# Patient Record
Sex: MALE | Race: WHITE | NOT HISPANIC OR LATINO | Employment: OTHER | URBAN - METROPOLITAN AREA
[De-identification: names, ages, dates, MRNs, and addresses within clinical notes are randomized per-mention and may not be internally consistent; named-entity substitution may affect disease eponyms.]

---

## 2019-02-20 ENCOUNTER — TELEPHONE (OUTPATIENT)
Dept: UROLOGY | Facility: MEDICAL CENTER | Age: 84
End: 2019-02-20

## 2019-02-20 NOTE — TELEPHONE ENCOUNTER
Reason for appointment/Complaint/Diagnosis : Urinary retention / pt has catheter  Patient was in [facility]  Daughter to have records faxed to [phone]  Insurance: Medicare    History of Cancer? No                      If yes, what kind? Not applicable    Previous urologist?     [facility] Chant[facility] Escud[facility]  [facility]sp[facility]     Records requested/where? [facility]     Outside testing/where? Not applicable    Location Preference for office visit?  [place]

## 2019-02-20 NOTE — TELEPHONE ENCOUNTER
Patient's daughter understands records are needed so this can be triaged  She's working on getting them today

## 2019-02-22 NOTE — TELEPHONE ENCOUNTER
Spoke with pt's daughter, Hussein Polanco, scheduled appt with [facility] in [address] [place] [address] 2/28  Time ok per Lukas Gentile  Please mail NP packet

## 2019-02-22 NOTE — TELEPHONE ENCOUNTER
Records received and refaxed to [facility] @ [phone]  Will take copy over to 240 to triage

## 2019-02-28 ENCOUNTER — OFFICE VISIT (OUTPATIENT)
Dept: UROLOGY | Facility: CLINIC | Age: 84
End: 2019-02-28
Payer: MEDICARE

## 2019-02-28 VITALS
HEIGHT: 65 IN | BODY MASS INDEX: 21.83 KG/M2 | WEIGHT: 131 LBS | SYSTOLIC BLOOD PRESSURE: 140 MMHG | DIASTOLIC BLOOD PRESSURE: 64 MMHG

## 2019-02-28 DIAGNOSIS — N40.1 BPH WITH OBSTRUCTION/LOWER URINARY TRACT SYMPTOMS: ICD-10-CM

## 2019-02-28 DIAGNOSIS — R33.9 URINARY RETENTION: Primary | ICD-10-CM

## 2019-02-28 DIAGNOSIS — N13.8 BPH WITH OBSTRUCTION/LOWER URINARY TRACT SYMPTOMS: ICD-10-CM

## 2019-02-28 PROCEDURE — 99204 OFFICE O/P NEW MOD 45 MIN: CPT | Performed by: UROLOGY

## 2019-02-28 RX ORDER — HYDROCODONE BITARTRATE AND ACETAMINOPHEN 5; 325 MG/1; MG/1
TABLET ORAL
COMMUNITY
Start: 2013-11-07

## 2019-02-28 RX ORDER — LEVOTHYROXINE SODIUM 0.03 MG/1
25 TABLET ORAL EVERY OTHER DAY
COMMUNITY

## 2019-02-28 RX ORDER — ASPIRIN 81 MG/1
81 TABLET, CHEWABLE ORAL
COMMUNITY
Start: 2012-02-15

## 2019-02-28 RX ORDER — CILOSTAZOL 50 MG/1
50 TABLET ORAL
COMMUNITY

## 2019-02-28 RX ORDER — LEVOTHYROXINE SODIUM 0.05 MG/1
50 TABLET ORAL
COMMUNITY

## 2019-02-28 RX ORDER — RANITIDINE 150 MG/1
150 CAPSULE ORAL
COMMUNITY

## 2019-02-28 RX ORDER — ATORVASTATIN CALCIUM 40 MG/1
40 TABLET, FILM COATED ORAL
COMMUNITY

## 2019-02-28 RX ORDER — FINASTERIDE 5 MG/1
5 TABLET, FILM COATED ORAL
COMMUNITY

## 2019-02-28 RX ORDER — LOSARTAN POTASSIUM 50 MG/1
50 TABLET ORAL
COMMUNITY

## 2019-02-28 RX ORDER — PANTOPRAZOLE SODIUM 40 MG/1
40 TABLET, DELAYED RELEASE ORAL
COMMUNITY

## 2019-02-28 RX ORDER — TAMSULOSIN HYDROCHLORIDE 0.4 MG/1
0.4 CAPSULE ORAL
COMMUNITY
Start: 2012-02-15

## 2019-02-28 NOTE — PROGRESS NOTES
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  Stay on the Flomax and the Proscar and I think his prognosis for recovery is good  Will see him back in the next few weeks for a voiding trial early in the morning  Chief Complaint     Chief Complaint   Patient presents with    Benign Prostatic Hypertrophy    Urinary Retention       History of Present Illness   New patient office visit:  BPH with obstruction with urinary retention  He currently has a Purcell catheter  He was going to have TURP in  but 1 of having heart bypass surgery and was seeing a urologist in being managed with medications up until now  He was actually doing quite well up until now when he had pneumonia a and he went into urinary retention  The pneumonia was 2 weeks ago  He could not urinate, but he did have sensation and volumes were about 600 cc  He is on Flomax and Proscar  Past medical history:  Coronary artery disease, pneumonia, hypothyroidism, bleeding ulcers, spinal stenosis,    Past surgical history:  Coronary artery bypass grafting, back surgery, appendectomy, hernia repair, partial gastrectomy due to bleeding ulcers    Social history:  Lives with his grandson  Quit smoking 30 years ago  Retired   The following portions of the patient's history were reviewed and updated as appropriate: allergies, current medications, past family history, past medical history, past social history, past surgical history and problem list     Review of Systems   Review of Systems   Gastrointestinal: Negative      Genitourinary: Urinary retention with Purcell catheter in place       Active Problem List   There is no problem list on file for this patient  Objective   /64   Ht 5' 5" (1 651 m)   Wt 59 4 kg (131 lb)   BMI 21 80 kg/m²     Physical Exam   Constitutional: He is oriented to person, place, and time  He appears well-developed and well-nourished  HENT:   Head: Normocephalic and atraumatic  Eyes: EOM are normal    Neck: Normal range of motion  Pulmonary/Chest: Effort normal    Abdominal: Soft  He exhibits no distension and no mass  There is no tenderness  There is no rebound and no guarding  No hernia  Genitourinary: Rectum normal, prostate normal and penis normal    Genitourinary Comments: Purcell catheter in place  Normal uncircumcised phallus  No inguinal hernia  Testicles are descended bilaterally and are within normal limits  Skin is normal   Rectal exam reveals normal tone, no masses and his prostate is about 40-50 g, smooth symmetric and benign  No nodules  Musculoskeletal: Normal range of motion  Neurological: He is alert and oriented to person, place, and time  Skin: Skin is warm and dry  Psychiatric: He has a normal mood and affect   His behavior is normal  Judgment and thought content normal            Current Medications     Current Outpatient Medications:     aspirin 81 mg CHEW, Chew 81 mg, Disp: , Rfl:     atorvastatin (LIPITOR) 40 mg tablet, Take 40 mg by mouth, Disp: , Rfl:     cilostazol (PLETAL) 50 mg tablet, Take 50 mg by mouth, Disp: , Rfl:     finasteride (PROSCAR) 5 mg tablet, Take 5 mg by mouth, Disp: , Rfl:     HYDROcodone-acetaminophen (NORCO) 5-325 mg per tablet, q6hprn, Disp: , Rfl:     levothyroxine 25 mcg tablet, Take 25 mcg by mouth every other day , Disp: , Rfl:     levothyroxine 50 mcg tablet, Take 50 mcg by mouth, Disp: , Rfl:     losartan (COZAAR) 50 mg tablet, Take 50 mg by mouth, Disp: , Rfl:     pantoprazole (PROTONIX) 40 mg tablet, Take 40 mg by mouth, Disp: , Rfl:     ranitidine (ZANTAC) 150 MG capsule, Take 150 mg by mouth, Disp: , Rfl:     tamsulosin (FLOMAX) 0 4 mg, Take 0 4 mg by mouth, Disp: , Rfl:         Samantha Miller MD

## 2019-03-14 ENCOUNTER — OFFICE VISIT (OUTPATIENT)
Dept: UROLOGY | Facility: CLINIC | Age: 84
End: 2019-03-14

## 2019-03-14 VITALS
HEART RATE: 69 BPM | HEIGHT: 65 IN | BODY MASS INDEX: 22.49 KG/M2 | DIASTOLIC BLOOD PRESSURE: 60 MMHG | WEIGHT: 135 LBS | SYSTOLIC BLOOD PRESSURE: 130 MMHG

## 2019-03-14 DIAGNOSIS — R33.9 URINARY RETENTION: Primary | ICD-10-CM

## 2019-03-14 PROCEDURE — 99212 OFFICE O/P EST SF 10 MIN: CPT | Performed by: UROLOGY

## 2019-03-14 RX ORDER — CIPROFLOXACIN 500 MG/1
500 TABLET, FILM COATED ORAL ONCE
Qty: 1 TABLET | Refills: 0 | Status: SHIPPED | OUTPATIENT
Start: 2019-03-14 | End: 2019-03-14

## 2019-03-14 NOTE — PROGRESS NOTES
[address] [facility]  [facility]  [address] [facility]  [place], [address] [place] [address]  [phone]  www  [facility]  org      NAME: Angelika Win  AGE: 80 y o  SEX: male  : 1932   MRN: [id number]    DATE: 3/14/2019  TIME: 8:32 AM    Assessment and Plan:    Urinary retention after pneumonia, hopefully this has resolved  Purcell catheter was removed and he was given 1 pill of Cipro  We will see if he can urinate and if he cannot we will replace the catheter  If he can urinate, I will see him back p r n  Elkin Edouard Chief Complaint   No chief complaint on file  History of Present Illness   Urinary retention acute after debilitating illness which was pneumonia  This was on top of 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  He is here for a voiding trial   No current complaints  The catheter was removed intact  The following portions of the patient's history were reviewed and updated as appropriate: allergies, current medications, past family history, past medical history, past social history, past surgical history and problem list     Review of Systems   Review of Systems    Active Problem List   There is no problem list on file for this patient  Objective   There were no vitals taken for this visit      Physical Exam        Current Medications     Current Outpatient Medications:     aspirin 81 mg CHEW, Chew 81 mg, Disp: , Rfl:     atorvastatin (LIPITOR) 40 mg tablet, Take 40 mg by mouth, Disp: , Rfl:     cilostazol (PLETAL) 50 mg tablet, Take 50 mg by mouth, Disp: , Rfl:     finasteride (PROSCAR) 5 mg tablet, Take 5 mg by mouth, Disp: , Rfl:     HYDROcodone-acetaminophen (NORCO) 5-325 mg per tablet, q6hprn, Disp: , Rfl:     levothyroxine 25 mcg tablet, Take 25 mcg by mouth every other day , Disp: , Rfl:     levothyroxine 50 mcg tablet, Take 50 mcg by mouth, Disp: , Rfl:     losartan (COZAAR) 50 mg tablet, Take 50 mg by mouth, Disp: , Rfl:    pantoprazole (PROTONIX) 40 mg tablet, Take 40 mg by mouth, Disp: , Rfl:     ranitidine (ZANTAC) 150 MG capsule, Take 150 mg by mouth, Disp: , Rfl:     tamsulosin (FLOMAX) 0 4 mg, Take 0 4 mg by mouth, Disp: , Rfl:         Juarez Ac MD

## 2019-03-14 NOTE — PROGRESS NOTES
Patient returns for Purcell removal  Purcell removed without difficulty, patient tolerated procedure well  Urine draining clear yellow  Denies fever or urinary symptoms  Patient instructed to increase fluids, especially water, and limit caffeine intake  To return to office if unable to void within 4-6 hours, or has increased discomfort

## 2021-10-13 ENCOUNTER — APPOINTMENT (RX ONLY)
Dept: URBAN - NONMETROPOLITAN AREA CLINIC 4 | Facility: CLINIC | Age: 86
Setting detail: DERMATOLOGY
End: 2021-10-13

## 2021-10-13 PROBLEM — D48.5 NEOPLASM OF UNCERTAIN BEHAVIOR OF SKIN: Status: ACTIVE | Noted: 2021-10-13

## 2021-10-13 PROCEDURE — 11312 SHAVE SKIN LESION 1.1-2.0 CM: CPT

## 2021-10-13 PROCEDURE — ? SHAVE REMOVAL

## 2021-10-13 NOTE — PROCEDURE: SHAVE REMOVAL
Medical Necessity Information: It is in your best interest to select a reason for this procedure from the list below. All of these items fulfill various CMS LCD requirements except the new and changing color options.
Medical Necessity Clause: This procedure was medically necessary because the lesion that was treated was:
Lab: 6
Lab Facility: 3
Detail Level: Detailed
Was A Bandage Applied: Yes
Size Of Lesion In Cm (Required): 1.2
X Size Of Lesion In Cm (Optional): 0
Biopsy Method: Dermablade
Anesthesia Type: 1% lidocaine without epinephrine
Hemostasis: Aluminum Chloride and Electrocautery
Wound Care: Bacitracin
Render Path Notes In Note?: No
Consent was obtained from the patient. The risks and benefits to therapy were discussed in detail. Specifically, the risks of infection, scarring, bleeding, prolonged wound healing, incomplete removal, allergy to anesthesia, nerve injury and recurrence were addressed. Prior to the procedure, the treatment site was clearly identified and confirmed by the patient. All components of Universal Protocol/PAUSE Rule completed.
Post-Care Instructions: I reviewed with the patient in detail post-care instructions. Patient is to keep the biopsy site dry overnight, and then apply bacitracin twice daily until healed. Patient may apply hydrogen peroxide soaks to remove any crusting.
Notification Instructions: Patient will be notified of pathology results. However, patient instructed to call the office if not contacted within 2 weeks.
Billing Type: Third-Party Bill

## 2021-12-08 ENCOUNTER — APPOINTMENT (RX ONLY)
Dept: URBAN - NONMETROPOLITAN AREA CLINIC 4 | Facility: CLINIC | Age: 86
Setting detail: DERMATOLOGY
End: 2021-12-08

## 2021-12-08 DIAGNOSIS — L28.0 LICHEN SIMPLEX CHRONICUS: ICD-10-CM | Status: INADEQUATELY CONTROLLED

## 2021-12-08 PROCEDURE — 99214 OFFICE O/P EST MOD 30 MIN: CPT

## 2021-12-08 PROCEDURE — ? TREATMENT REGIMEN

## 2021-12-08 PROCEDURE — ? PRESCRIPTION

## 2021-12-08 PROCEDURE — ? COUNSELING

## 2021-12-08 RX ORDER — HYDROCORTISONE 25 MG/G
2.5% OINTMENT TOPICAL BID
Qty: 28.35 | Refills: 2 | Status: ERX | COMMUNITY
Start: 2021-12-08

## 2021-12-08 RX ADMIN — HYDROCORTISONE 2.5%: 25 OINTMENT TOPICAL at 00:00

## 2021-12-08 ASSESSMENT — LOCATION ZONE DERM: LOCATION ZONE: FACE

## 2021-12-08 ASSESSMENT — LOCATION SIMPLE DESCRIPTION DERM: LOCATION SIMPLE: LEFT CHEEK

## 2021-12-08 ASSESSMENT — LOCATION DETAILED DESCRIPTION DERM: LOCATION DETAILED: LEFT SUPERIOR CENTRAL MALAR CHEEK

## 2021-12-08 NOTE — PROCEDURE: TREATMENT REGIMEN
Detail Level: Zone
Plan: Patient will follow up with eye doctor due to complaints of watering gritty eyes.
Modify Regimen: Do not rub or scratch at area.
Initiate Treatment: Hydrocortisone ointment BID x 1 month; daily moisturizing cream Cerave or Cetaphil

## 2024-10-26 ENCOUNTER — APPOINTMENT (EMERGENCY)
Dept: RADIOLOGY | Facility: HOSPITAL | Age: 89
End: 2024-10-26
Payer: COMMERCIAL

## 2024-10-26 ENCOUNTER — HOSPITAL ENCOUNTER (EMERGENCY)
Facility: HOSPITAL | Age: 89
Discharge: HOME | End: 2024-10-26
Attending: EMERGENCY MEDICINE
Payer: COMMERCIAL

## 2024-10-26 VITALS
RESPIRATION RATE: 16 BRPM | HEART RATE: 56 BPM | OXYGEN SATURATION: 98 % | WEIGHT: 127.87 LBS | HEIGHT: 66 IN | SYSTOLIC BLOOD PRESSURE: 148 MMHG | DIASTOLIC BLOOD PRESSURE: 64 MMHG | TEMPERATURE: 97.3 F | BODY MASS INDEX: 20.55 KG/M2

## 2024-10-26 DIAGNOSIS — R07.89 CHEST TIGHTNESS: ICD-10-CM

## 2024-10-26 DIAGNOSIS — J06.9 VIRAL UPPER RESPIRATORY TRACT INFECTION: ICD-10-CM

## 2024-10-26 DIAGNOSIS — J44.1 COPD EXACERBATION (HCC): Primary | ICD-10-CM

## 2024-10-26 LAB
ALBUMIN SERPL BCG-MCNC: 3.9 G/DL (ref 3.5–5)
ALP SERPL-CCNC: 67 U/L (ref 34–104)
ALT SERPL W P-5'-P-CCNC: 11 U/L (ref 7–52)
ANION GAP SERPL CALCULATED.3IONS-SCNC: 8 MMOL/L (ref 4–13)
AST SERPL W P-5'-P-CCNC: 14 U/L (ref 13–39)
BASOPHILS # BLD MANUAL: 0.04 THOUSAND/UL (ref 0–0.1)
BASOPHILS NFR MAR MANUAL: 1 % (ref 0–1)
BILIRUB DIRECT SERPL-MCNC: 0.27 MG/DL (ref 0–0.2)
BILIRUB SERPL-MCNC: 0.51 MG/DL (ref 0.2–1)
BNP SERPL-MCNC: 60 PG/ML (ref 0–100)
BUN SERPL-MCNC: 31 MG/DL (ref 5–25)
BURR CELLS BLD QL SMEAR: PRESENT
CALCIUM SERPL-MCNC: 8.8 MG/DL (ref 8.4–10.2)
CARDIAC TROPONIN I PNL SERPL HS: 4 NG/L (ref 8–18)
CHLORIDE SERPL-SCNC: 104 MMOL/L (ref 96–108)
CO2 SERPL-SCNC: 28 MMOL/L (ref 21–32)
CREAT SERPL-MCNC: 1.6 MG/DL (ref 0.6–1.3)
EOSINOPHIL # BLD MANUAL: 0.27 THOUSAND/UL (ref 0–0.4)
EOSINOPHIL NFR BLD MANUAL: 7 % (ref 0–6)
ERYTHROCYTE [DISTWIDTH] IN BLOOD BY AUTOMATED COUNT: 14.5 % (ref 11.6–15.1)
FLUAV AG UPPER RESP QL IA.RAPID: NEGATIVE
FLUBV AG UPPER RESP QL IA.RAPID: NEGATIVE
GFR SERPL CREATININE-BSD FRML MDRD: 36 ML/MIN/1.73SQ M
GLUCOSE SERPL-MCNC: 101 MG/DL (ref 65–140)
HCT VFR BLD AUTO: 39 % (ref 36.5–49.3)
HGB BLD-MCNC: 11.8 G/DL (ref 12–17)
LYMPHOCYTES # BLD AUTO: 1.18 THOUSAND/UL (ref 0.6–4.47)
LYMPHOCYTES # BLD AUTO: 22 % (ref 14–44)
MAGNESIUM SERPL-MCNC: 2.1 MG/DL (ref 1.9–2.7)
MCH RBC QN AUTO: 27.8 PG (ref 26.8–34.3)
MCHC RBC AUTO-ENTMCNC: 30.3 G/DL (ref 31.4–37.4)
MCV RBC AUTO: 92 FL (ref 82–98)
MONOCYTES # BLD AUTO: 0.38 THOUSAND/UL (ref 0–1.22)
MONOCYTES NFR BLD: 10 % (ref 4–12)
NEUTROPHILS # BLD MANUAL: 1.94 THOUSAND/UL (ref 1.85–7.62)
NEUTS SEG NFR BLD AUTO: 51 % (ref 43–75)
PHOSPHATE SERPL-MCNC: 4 MG/DL (ref 2.3–4.1)
PLATELET # BLD AUTO: 97 THOUSANDS/UL (ref 149–390)
PLATELET BLD QL SMEAR: ABNORMAL
PMV BLD AUTO: 9.3 FL (ref 8.9–12.7)
POIKILOCYTOSIS BLD QL SMEAR: PRESENT
POTASSIUM SERPL-SCNC: 4.5 MMOL/L (ref 3.5–5.3)
PROCALCITONIN SERPL-MCNC: <0.05 NG/ML
PROT SERPL-MCNC: 5.8 G/DL (ref 6.4–8.4)
RBC # BLD AUTO: 4.25 MILLION/UL (ref 3.88–5.62)
SARS-COV+SARS-COV-2 AG RESP QL IA.RAPID: NEGATIVE
SMUDGE CELLS BLD QL SMEAR: PRESENT
SODIUM SERPL-SCNC: 140 MMOL/L (ref 135–147)
VARIANT LYMPHS # BLD AUTO: 9 %
WBC # BLD AUTO: 3.81 THOUSAND/UL (ref 4.31–10.16)

## 2024-10-26 PROCEDURE — 93005 ELECTROCARDIOGRAM TRACING: CPT

## 2024-10-26 PROCEDURE — 99285 EMERGENCY DEPT VISIT HI MDM: CPT | Performed by: EMERGENCY MEDICINE

## 2024-10-26 PROCEDURE — 85027 COMPLETE CBC AUTOMATED: CPT

## 2024-10-26 PROCEDURE — 71046 X-RAY EXAM CHEST 2 VIEWS: CPT

## 2024-10-26 PROCEDURE — 84100 ASSAY OF PHOSPHORUS: CPT

## 2024-10-26 PROCEDURE — 87811 SARS-COV-2 COVID19 W/OPTIC: CPT

## 2024-10-26 PROCEDURE — 80076 HEPATIC FUNCTION PANEL: CPT

## 2024-10-26 PROCEDURE — 87804 INFLUENZA ASSAY W/OPTIC: CPT

## 2024-10-26 PROCEDURE — 80048 BASIC METABOLIC PNL TOTAL CA: CPT

## 2024-10-26 PROCEDURE — 84145 PROCALCITONIN (PCT): CPT | Performed by: EMERGENCY MEDICINE

## 2024-10-26 PROCEDURE — 85007 BL SMEAR W/DIFF WBC COUNT: CPT

## 2024-10-26 PROCEDURE — 83735 ASSAY OF MAGNESIUM: CPT

## 2024-10-26 PROCEDURE — 96374 THER/PROPH/DIAG INJ IV PUSH: CPT

## 2024-10-26 PROCEDURE — 83880 ASSAY OF NATRIURETIC PEPTIDE: CPT | Performed by: EMERGENCY MEDICINE

## 2024-10-26 PROCEDURE — 84484 ASSAY OF TROPONIN QUANT: CPT | Performed by: EMERGENCY MEDICINE

## 2024-10-26 PROCEDURE — 99285 EMERGENCY DEPT VISIT HI MDM: CPT

## 2024-10-26 PROCEDURE — 94640 AIRWAY INHALATION TREATMENT: CPT

## 2024-10-26 PROCEDURE — 36415 COLL VENOUS BLD VENIPUNCTURE: CPT

## 2024-10-26 PROCEDURE — 96372 THER/PROPH/DIAG INJ SC/IM: CPT

## 2024-10-26 RX ORDER — ONDANSETRON 2 MG/ML
4 INJECTION INTRAMUSCULAR; INTRAVENOUS ONCE
Status: COMPLETED | OUTPATIENT
Start: 2024-10-26 | End: 2024-10-26

## 2024-10-26 RX ORDER — PREDNISONE 20 MG/1
20 TABLET ORAL DAILY
Qty: 3 TABLET | Refills: 0 | Status: SHIPPED | OUTPATIENT
Start: 2024-10-26 | End: 2024-10-29

## 2024-10-26 RX ORDER — AZITHROMYCIN 500 MG/1
500 TABLET, FILM COATED ORAL DAILY
Qty: 5 TABLET | Refills: 0 | Status: SHIPPED | OUTPATIENT
Start: 2024-10-26 | End: 2024-10-31

## 2024-10-26 RX ORDER — PREDNISONE 20 MG/1
40 TABLET ORAL ONCE
Status: COMPLETED | OUTPATIENT
Start: 2024-10-26 | End: 2024-10-26

## 2024-10-26 RX ORDER — GABAPENTIN 100 MG/1
100 CAPSULE ORAL DAILY
COMMUNITY

## 2024-10-26 RX ORDER — FAMOTIDINE 40 MG/1
40 TABLET, FILM COATED ORAL DAILY
COMMUNITY

## 2024-10-26 RX ORDER — IPRATROPIUM BROMIDE AND ALBUTEROL SULFATE 2.5; .5 MG/3ML; MG/3ML
3 SOLUTION RESPIRATORY (INHALATION) 4 TIMES DAILY
Qty: 30 ML | Refills: 0 | Status: SHIPPED | OUTPATIENT
Start: 2024-10-26 | End: 2024-11-25

## 2024-10-26 RX ORDER — AZITHROMYCIN 250 MG/1
500 TABLET, FILM COATED ORAL ONCE
Status: COMPLETED | OUTPATIENT
Start: 2024-10-26 | End: 2024-10-26

## 2024-10-26 RX ORDER — FUROSEMIDE 20 MG/1
20 TABLET ORAL EVERY 24 HOURS
COMMUNITY

## 2024-10-26 RX ORDER — IPRATROPIUM BROMIDE AND ALBUTEROL SULFATE 2.5; .5 MG/3ML; MG/3ML
3 SOLUTION RESPIRATORY (INHALATION) ONCE
Status: COMPLETED | OUTPATIENT
Start: 2024-10-26 | End: 2024-10-26

## 2024-10-26 RX ADMIN — IPRATROPIUM BROMIDE AND ALBUTEROL SULFATE 3 ML: .5; 3 SOLUTION RESPIRATORY (INHALATION) at 16:35

## 2024-10-26 RX ADMIN — ONDANSETRON 4 MG: 2 INJECTION INTRAMUSCULAR; INTRAVENOUS at 16:35

## 2024-10-26 RX ADMIN — PREDNISONE 40 MG: 20 TABLET ORAL at 17:10

## 2024-10-26 RX ADMIN — AZITHROMYCIN DIHYDRATE 500 MG: 250 TABLET ORAL at 17:09

## 2024-10-26 RX ADMIN — TRIMETHOBENZAMIDE HYDROCHLORIDE 200 MG: 100 INJECTION INTRAMUSCULAR at 17:30

## 2024-10-26 NOTE — DISCHARGE INSTRUCTIONS
Please take azithromycin 500mg daily for 5 days, prednisone 20mg for 3 days, and use duo-neb every day.     Schedule follow up visit with primary care doctor with the earliest appointment after ED visit.    If patient has increased SOB, nausea, vomiting, please return to the ED for further evaluation.

## 2024-10-26 NOTE — ED ATTENDING ATTESTATION
10/26/2024  I, Philip Ferreira DO, saw and evaluated the patient. I have discussed the patient with Dr Pond and agree with her] findings, Plan of Care, and MDM as documented in her note, except where noted. All available labs and Radiology studies were reviewed.  I was present for key portions of any procedure(s) performed by Dr Pond, and I was immediately available to provide assistance.  At this point I agree with the current assessment done in the Emergency Department.  I have conducted an independent evaluation of this patient. The history and physical is as follows:    89 y/o man with noted PMH presents to ED with family; has had persistent cough p/o phlegm for about the past week. Had mild URI sx beginning about 1 wk prior, after which his daughter became ill with similar sx. Sx began after travel to [place] 1 wk prior. Patient was febrile x1d but not persistently. He does seem to have a worsening cough after eating; his family is worried that he may have aspirated at various times. He does note that he has choked occasionally when swallowing.  For the past week in particular he has felt dyspneic at rest and with minimal exertion, along with some orthopnea. This is unusual for him; has diagnosis of COPD, but does not typically have dyspnea nor does he have a chronic cough.   +mild odynophagia; no dysphonia. No pain with neck movement. No chest pain/LE edema/syncope/palpitations/abd pain.   No abx use in past 30d.  ROS as per HPI: otherwise negative.    General: Awake/alert/no distress.   Cachectic  Vital signs and nursing notes reviewed    HEENT:  Normocephalic/atraumatic  External ear/hearing wnl bilaterally.    Neck:  Phonation normal with no stridor/dysphonia.  Normal active ROM.  No palpable masses or thyromegaly.  No overlying skin changes.    Cardiac:  Radial pulses 2+ bilaterally  DP/PT pulses 2+ bilaterally  RRR with s1/s2; no m/r/g.  No peripheral edema    Pulmonary:   No respiratory  distress.  No accessory muscle use  Lungs CTA b/l with no w/c/r    Abdomen:  Flat. Nondistended. Nontender. No palpable masses.  No guarding/rebound ttp.    Skin:  Warm/dry. No diaphoresis.  No visible rashes or skin changes.    Neuro:  GCS 15.  PERRLA; EOMI. Facial expressions symmetric.  Tongue/uvula midline.  Shoulder shrug equal bilaterally  Strength 5/5 in UE/LE bilaterally  Intact sensation in UE/LE bilaterally.    DDx includes but is not limited to: pneumonia, pulmonary edema, pleural effusion, aspiration pneumonitis, etc. Check cbc/bmp/bnp/trop. Cxr. ECG. Will perform dysphagia screen in the ED as well.    ED Course  ED Course as of 10/27/24 1437   Sat Oct 26, 2024   1508 ECG sinus bradycardia 53 bpm    QTc 456  RBBB  TWI V3-V5  No ST changes  No ectopy  No prior for comparison  Interpreted by me   1554 Basic metabolic panel(!)  Electrolytes wnl  Mildly decreased renal function from baseline   1608 High Sensitivity Troponin I Random(!)  Nonischemic; duration of sx such that ACS can reasonably be excluded   1618 CBC and differential(!)  Leukopenia without neutropenia.  Thrombocytopenia.  Mild anemia.  Has been thrombocytopenic previously although not consistently leukopenic such as he is now.   1624 FLU/COVID Rapid Antigen (30 min. TAT) - Preferred screening test in ED  Negative   1635 Had longstanding smoking history although abstinent for years now.  Carries diagnosis of centrilobular emphysema.  With recent URI and now persistent cough productive of phlegm even with negative chest radiograph, concerning for exacerbation of underlying COPD.  Will give nebulizer treatment and reassess.   1642 B-Type Natriuretic Peptide(BNP)  Normal   1646 Procalcitonin  Negative   1659 I signed over the case to Dr Kelly. Pending check of PO tolerance and disposition.         Critical Care Time  Procedures

## 2024-10-26 NOTE — ED PROVIDER NOTES
Time reflects when diagnosis was documented in both MDM as applicable and the Disposition within this note       Time User Action Codes Description Comment    10/26/2024  3:30 PM Serafin Pond Add [J06.9] Viral upper respiratory tract infection     10/26/2024  3:30 PM Serafin Pond Add [R07.89] Chest tightness     10/26/2024  4:54 PM Serafin Pond Add [J44.1] COPD exacerbation (HCC)     10/26/2024  4:54 PM Serafin Pond Modify [J06.9] Viral upper respiratory tract infection     10/26/2024  4:54 PM Serafin Pond Modify [J44.1] COPD exacerbation (HCC)           ED Disposition       ED Disposition   Discharge    Condition   Stable    Date/Time   Sat Oct 26, 2024  6:41 PM    Comment   Isidro Hernandez discharge to home/self care.                   Assessment & Plan       Medical Decision Making  Isidro Hernandez is a 89 yo male with hx of COPD, CAD, BPH, anemia, hypothyroidism presents with chest tightness and sore throat. Patient has URI symptoms including low grade fever, cough a week ago when he travel out of state. Patient's symptom could stem from pulm etiologies including pneumonia vs cardio ACS/MI vs simple viral URI vs COPD exacerbation in the setting of viral URI    Ordered CBC and CXR to r/o possible pneumonia or infection; CBC with leukopenia and thrombocytopenia similar to previous lab result. CXR with no acute pulm abnormalities, no consolidation indicating pneumonia.   Ordered BMP to check electrolyte, result unremarkable  Covid/flu panel check for possible common URI, result negative  Check BNP, troponin, and EKG for ACS, MI; Initial EKG with sinus chidi 53 and RBBB, no acute ST changes or QT prolongation. Troponin ~4 and likely non-ischemic, r/o ACS    Likely COPD exacerbation 2/2 viral URI, trial nebulizer to see if his cough and chest tightness improved.   Zofran and Tigan given one dose IM for N/V.     Instruct patient to follow up with PCP after discharge for COPD exacerbation management. Continue azithromycin  500mg daily for 5 days and prednisone 20mg for 3 days. Will trial nebulizer to see if patient could breath better at home.     Problems Addressed:  Chest tightness: acute illness or injury     Details: See COPD plan  COPD exacerbation (HCC): acute illness or injury     Details: Started this morning. Patient has hx of COPD and one week hx of URI symptoms including low grade fever and cough.  Trial nebulizer and started prednisone po treatment  Viral upper respiratory tract infection: acute illness or injury     Details: One week hx of URI symptoms, denied N/V, abdominal pain, diarrhea  Tylenol as needed for fever    Amount and/or Complexity of Data Reviewed  Labs: ordered. Decision-making details documented in ED Course.  Radiology: ordered and independent interpretation performed. Decision-making details documented in ED Course.  ECG/medicine tests: ordered. Decision-making details documented in ED Course.     Details: ECG sinus bradycardia 53 bpm     QTc 456   RBBB, no ST changes, QT prolongation    Risk  Prescription drug management.        ED Course as of 10/26/24 1913   Sat Oct 26, 2024   1520 Ordered CXR to check for pneumonia; CBC, procal to check for any infection.   1528 Check troponin to r/o possible ACS or MI   1530 Patient's vital stable, remain afebrile. Breathing on RA with normal saturation.    1622 CXR wet read unremarkable, no consolidation indicating pneumonia   1624 BMP wnl, mildly elevated creatinine    1633 Zofran prn for nausea   1833 Normal magnesium and phosphorus. Hepatic function wnl       Medications   ipratropium-albuterol (DUO-NEB) 0.5-2.5 mg/3 mL inhalation solution 3 mL (3 mL Nebulization Given 10/26/24 1635)   ondansetron (ZOFRAN) injection 4 mg (4 mg Intravenous Given 10/26/24 1635)   azithromycin (ZITHROMAX) tablet 500 mg (500 mg Oral Given 10/26/24 1709)   predniSONE tablet 40 mg (40 mg Oral Given 10/26/24 1710)   trimethobenzamide (TIGAN) IM injection 200 mg (200 mg  Intramuscular Given 10/26/24 1730)       ED Risk Strat Scores                           SBIRT 20yo+      Flowsheet Row Most Recent Value   Initial Alcohol Screen: US AUDIT-C     1. How often do you have a drink containing alcohol? 0 Filed at: 10/26/2024 1446   2. How many drinks containing alcohol do you have on a typical day you are drinking?  0 Filed at: 10/26/2024 1446   3a. Male UNDER 65: How often do you have five or more drinks on one occasion? 0 Filed at: 10/26/2024 1446   3b. FEMALE Any Age, or MALE 65+: How often do you have 4 or more drinks on one occassion? 0 Filed at: 10/26/2024 1446   Audit-C Score 0 Filed at: 10/26/2024 1446   DESTINI: How many times in the past year have you...    Used an illegal drug or used a prescription medication for non-medical reasons? Never Filed at: 10/26/2024 1446                            History of Present Illness       Chief Complaint   Patient presents with    Cough     Patient presents with chest congestion, cough and feel like food is getting stuck for 2 weeks worsening for the last 2 days        Past Medical History:   Diagnosis Date    Acute aspiration pneumonia (HCC) 02/16/2019    Anemia     Anxiety     Arteriosclerotic cardiovascular disease (ASCVD)     Back disorder     BPH with obstruction/lower urinary tract symptoms     Cardiac arrhythmia     COPD (chronic obstructive pulmonary disease) (HCC)     Coronary artery disease     Hyperlipidemia     Hypothyroidism     Malnutrition, calorie (HCC)     Acute    Osteoarthritis     Peptic ulcer disease     Pneumonia     Recurrent sinusitis       Past Surgical History:   Procedure Laterality Date    APPENDECTOMY      CORONARY ARTERY BYPASS GRAFT  2012    X 4 LIMA to LAD, SVG to RCA, SVG to D1 and OM    HERNIA REPAIR      STOMACH SURGERY      for bleeding ulcers      Family History   Problem Relation Age of Onset    Diabetes Mother     Cancer Mother     Heart disease Father     Cancer Father     Gout Son       Social History      Tobacco Use    Smoking status: Former     Current packs/day: 0.00     Types: Cigarettes     Quit date:      Years since quittin.8    Smokeless tobacco: Never   Substance Use Topics    Alcohol use: Yes     Comment: 1 glass of wine every couple of days    Drug use: Never      E-Cigarette/Vaping      E-Cigarette/Vaping Substances      I have reviewed and agree with the history as documented.     Isidro Hernandez is a 89 yo male with hx of COPD, CAD, BPH, anemia, hypothyroidism, and  presents with chest tightness. Patient traveled to [place] a week ago and has started having coughs with low grade fever. Patient took OTC tylenol with improved fever, but this morning he endorses chest tightness and sore throat.  Patient feels slightly short of breath and dizzy. Patient's family member is sick after him with similar symptom. Covid home test done a week ago was negative. Denied headache, N/V, CP, abdominal pain, diarrhea, constipation, changes with urination. Patient has no hx of smoking.       History provided by:  Patient   used: No    Cough  Cough characteristics:  Non-productive  Severity:  Mild  Onset quality:  Gradual  Duration:  1 week  Timing:  Intermittent  Progression:  Unchanged  Smoker: no    Context: upper respiratory infection    Relieved by:  Nothing  Associated symptoms: fever, myalgias, shortness of breath and sore throat    Associated symptoms: no chest pain, no chills, no headaches, no rash, no rhinorrhea, no sinus congestion and no wheezing    Myalgias:     Location:  Generalized    Severity:  Mild    Duration:  1 day  Shortness of breath:     Severity:  Mild    Onset quality:  Sudden    Duration:  1 day  Risk factors: recent infection and recent travel        Review of Systems   Constitutional:  Positive for fever. Negative for appetite change and chills.   HENT:  Positive for sore throat. Negative for congestion and rhinorrhea.    Respiratory:  Positive for cough,  chest tightness and shortness of breath. Negative for wheezing.    Cardiovascular:  Negative for chest pain and palpitations.   Gastrointestinal:  Negative for abdominal pain, diarrhea, nausea and vomiting.   Genitourinary:  Negative for dysuria and hematuria.   Musculoskeletal:  Positive for myalgias. Negative for arthralgias and back pain.   Skin:  Negative for color change and rash.   Neurological:  Positive for dizziness. Negative for headaches.   All other systems reviewed and are negative.          Objective       ED Triage Vitals   Temperature Pulse Blood Pressure Respirations SpO2 Patient Position - Orthostatic VS   10/26/24 1508 10/26/24 1500 10/26/24 1500 10/26/24 1500 10/26/24 1500 10/26/24 1500   (!) 97.3 °F (36.3 °C) (!) 53 146/66 22 96 % Lying      Temp Source Heart Rate Source BP Location FiO2 (%) Pain Score    10/26/24 1508 10/26/24 1500 10/26/24 1500 -- --    Temporal Monitor Left arm        Vitals      Date and Time Temp Pulse SpO2 Resp BP Pain Score FACES Pain Rating User   10/26/24 1653 -- 56 98 % 16 148/64 -- -- ZTR   10/26/24 1508 97.3 °F (36.3 °C) -- -- -- -- -- -- KAK   10/26/24 1500 -- 53 96 % 22 146/66 -- -- AB            Physical Exam  Vitals reviewed.   Constitutional:       General: He is not in acute distress.     Appearance: Normal appearance.   HENT:      Head: Normocephalic and atraumatic.      Right Ear: External ear normal.      Left Ear: External ear normal.      Nose: Nose normal. No congestion or rhinorrhea.      Mouth/Throat:      Mouth: Mucous membranes are moist.      Pharynx: Oropharynx is clear. No oropharyngeal exudate or posterior oropharyngeal erythema.   Eyes:      General:         Right eye: No discharge.         Left eye: No discharge.      Extraocular Movements: Extraocular movements intact.      Conjunctiva/sclera: Conjunctivae normal.      Pupils: Pupils are equal, round, and reactive to light.   Cardiovascular:      Rate and Rhythm: Normal rate and regular rhythm.       Pulses: Normal pulses.      Heart sounds: Normal heart sounds. No murmur heard.  Pulmonary:      Effort: Pulmonary effort is normal. No respiratory distress.      Breath sounds: Normal breath sounds. No wheezing or rales.   Chest:      Chest wall: No tenderness.   Abdominal:      General: Abdomen is flat. Bowel sounds are normal. There is no distension.      Palpations: Abdomen is soft.      Tenderness: There is no abdominal tenderness. There is no guarding or rebound.   Musculoskeletal:         General: No swelling or tenderness. Normal range of motion.      Cervical back: Normal range of motion and neck supple. No rigidity.      Right lower leg: No edema.      Left lower leg: No edema.   Skin:     General: Skin is warm.      Capillary Refill: Capillary refill takes less than 2 seconds.   Neurological:      Mental Status: He is alert and oriented to person, place, and time.         Results Reviewed       Procedure Component Value Units Date/Time    Hepatic function panel [[id number]]  (Abnormal) Collected: 10/26/24 1732    Lab Status: Final result Specimen: Blood from Arm, Right Updated: 10/26/24 1801     Total Bilirubin 0.51 mg/dL      Bilirubin, Direct 0.27 mg/dL      Alkaline Phosphatase 67 U/L      AST 14 U/L      ALT 11 U/L      Total Protein 5.8 g/dL      Albumin 3.9 g/dL     Magnesium [[id number]]  (Normal) Collected: 10/26/24 1732    Lab Status: Final result Specimen: Blood from Arm, Right Updated: 10/26/24 1801     Magnesium 2.1 mg/dL     Phosphorus [[id number]]  (Normal) Collected: 10/26/24 1732    Lab Status: Final result Specimen: Blood from Arm, Right Updated: 10/26/24 1801     Phosphorus 4.0 mg/dL     RBC Morphology Reflex Test [[id number]] Collected: 10/26/24 1523    Lab Status: Final result Specimen: Blood from Arm, Right Updated: 10/26/24 1701    Procalcitonin [[id number]]  (Normal) Collected: 10/26/24 1523    Lab Status: Final result Specimen: Blood from Arm, Right Updated: 10/26/24 1646      Procalcitonin <0.05 ng/ml     B-Type Natriuretic Peptide(BNP) [[id number]]  (Normal) Collected: 10/26/24 1523    Lab Status: Final result Specimen: Blood from Arm, Right Updated: 10/26/24 1642     BNP 60 pg/mL     FLU/COVID Rapid Antigen (30 min. TAT) - Preferred screening test in ED [[id number]]  (Normal) Collected: 10/26/24 1523    Lab Status: Final result Specimen: Nares from Nose Updated: 10/26/24 1624     SARS COV Rapid Antigen Negative     Influenza A Rapid Antigen Negative     Influenza B Rapid Antigen Negative    Narrative:      This test has been performed using the [employer] Goldie 2 FLU+SARS Antigen test under the Emergency Use Authorization (EUA). This test has been validated by the  and verified by the performing laboratory. The Goldie uses lateral flow immunofluorescent sandwich assay to detect SARS-COV, Influenza A and Influenza B Antigen.     The [employer] Goldie 2 SARS Antigen test does not differentiate between SARS-CoV and SARS-CoV-2.     Negative results are presumptive and may be confirmed with a molecular assay, if necessary, for patient management. Negative results do not rule out SARS-CoV-2 or influenza infection and should not be used as the sole basis for treatment or patient management decisions. A negative test result may occur if the level of antigen in a sample is below the limit of detection of this test.     Positive results are indicative of the presence of viral antigens, but do not rule out bacterial infection or co-infection with other viruses.     All test results should be used as an adjunct to clinical observations and other information available to the provider.    FOR PEDIATRIC PATIENTS - copy/paste COVID Guidelines URL to browser: [email or web address]    Manual Differential(PHLEBS Do Not Order) [[id number]]  (Abnormal) Collected: 10/26/24 1523    Lab Status: Final result Specimen: Blood from Arm, Right Updated: 10/26/24  1618     Segmented % 51 %      Lymphocytes % 22 %      Monocytes % 10 %      Eosinophils % 7 %      Basophils % 1 %      Atypical Lymphocytes % 9 %      Absolute Neutrophils 1.94 Thousand/uL      Absolute Lymphocytes 1.18 Thousand/uL      Absolute Monocytes 0.38 Thousand/uL      Absolute Eosinophils 0.27 Thousand/uL      Absolute Basophils 0.04 Thousand/uL      Total Counted --     Smudge Cells Present     Platelet Estimate Decreased     Toyin Cells Present     Poikilocytes Present    CBC and differential [[id number]]  (Abnormal) Collected: 10/26/24 1523    Lab Status: Final result Specimen: Blood from Arm, Right Updated: 10/26/24 1618     WBC 3.81 Thousand/uL      RBC 4.25 Million/uL      Hemoglobin 11.8 g/dL      Hematocrit 39.0 %      MCV 92 fL      MCH 27.8 pg      MCHC 30.3 g/dL      RDW 14.5 %      MPV 9.3 fL      Platelets 97 Thousands/uL     High Sensitivity Troponin I Random [[id number]]  (Abnormal) Collected: 10/26/24 1523    Lab Status: Final result Specimen: Blood from Arm, Right Updated: 10/26/24 1600     HS TnI random 4 ng/L     Basic metabolic panel [[id number]]  (Abnormal) Collected: 10/26/24 1523    Lab Status: Final result Specimen: Blood from Arm, Right Updated: 10/26/24 1553     Sodium 140 mmol/L      Potassium 4.5 mmol/L      Chloride 104 mmol/L      CO2 28 mmol/L      ANION GAP 8 mmol/L      BUN 31 mg/dL      Creatinine 1.60 mg/dL      Glucose 101 mg/dL      Calcium 8.8 mg/dL      eGFR 36 ml/min/1.73sq m     Narrative:      National Kid[employer] Disease Foundation guidelines for Chronic Kidney Disease (CKD):     Stage 1 with normal or high GFR (GFR > 90 mL/min/1.73 square meters)    Stage 2 Mild CKD (GFR = 60-89 mL/min/1.73 square meters)    Stage 3A Moderate CKD (GFR = 45-59 mL/min/1.73 square meters)    Stage 3B Moderate CKD (GFR = 30-44 mL/min/1.73 square meters)    Stage 4 Severe CKD (GFR = 15-29 mL/min/1.73 square meters)    Stage 5 End Stage CKD (GFR <15 mL/min/1.73 square meters)  Note: GFR  calculation is accurate only with a steady state creatinine            XR chest 2 views   ED Interpretation by Philip Ferreira DO (10/26 1538)   Airway is midline. Lungs are clear bilaterally with no evidence of pulmonary vascular congestion/focal infiltrate/pleural effusion/pneumothorax. Cardiac and mediastinal silhouettes are within normal limits. Osseous structures appear normal.            Procedures    ED Medication and Procedure Management   Prior to Admission Medications   Prescriptions Last Dose Informant Patient Reported? Taking?   HYDROcodone-acetaminophen (NORCO) 5-325 mg per tablet   Yes No   Sig: q6hprn   aspirin 81 mg CHEW 10/25/2024  Yes Yes   Sig: Chew 81 mg   atorvastatin (LIPITOR) 40 mg tablet 10/25/2024  Yes Yes   Sig: Take 40 mg by mouth   cilostazol (PLETAL) 50 mg tablet   Yes No   Sig: Take 50 mg by mouth   famotidine (PEPCID) 40 MG tablet 10/25/2024  Yes Yes   Sig: Take 40 mg by mouth daily   finasteride (PROSCAR) 5 mg tablet 10/25/2024  Yes Yes   Sig: Take 5 mg by mouth   furosemide (LASIX) 20 mg tablet 10/26/2024  Yes Yes   Sig: Take 20 mg by mouth every 24 hours   gabapentin (NEURONTIN) 100 mg capsule 10/25/2024  Yes Yes   Sig: Take 100 mg by mouth daily   levothyroxine 25 mcg tablet 10/26/2024  Yes Yes   Sig: Take 25 mcg by mouth every other day    levothyroxine 50 mcg tablet 10/26/2024  Yes Yes   Sig: Take 50 mcg by mouth   losartan (COZAAR) 50 mg tablet Not Taking  Yes No   Sig: Take 50 mg by mouth   Patient not taking: Reported on 10/26/2024   pantoprazole (PROTONIX) 40 mg tablet 10/26/2024  Yes Yes   Sig: Take 40 mg by mouth   ranitidine (ZANTAC) 150 MG capsule   Yes No   Sig: Take 150 mg by mouth   tamsulosin (FLOMAX) 0.4 mg 10/26/2024  Yes Yes   Sig: Take 0.4 mg by mouth      Facility-Administered Medications: None     Discharge Medication List as of 10/26/2024  6:47 PM        START taking these medications    Details   azithromycin (ZITHROMAX) 500 MG tablet Take 1 tablet (500 mg  total) by mouth daily for 5 days, Starting Sat 10/26/2024, Until Thu 10/31/2024, Normal      ipratropium-albuterol (DUO-NEB) 0.5-2.5 mg/3 mL nebulizer solution Take 3 mL by nebulization 4 (four) times a day, Starting Sat 10/26/2024, Until Mon 11/25/2024, Normal      predniSONE 20 mg tablet Take 1 tablet (20 mg total) by mouth daily for 3 days, Starting Sat 10/26/2024, Until Tue 10/29/2024, Normal           CONTINUE these medications which have NOT CHANGED    Details   aspirin 81 mg CHEW Chew 81 mg, Starting Wed 2/15/2012, Historical Med      atorvastatin (LIPITOR) 40 mg tablet Take 40 mg by mouth, Historical Med      famotidine (PEPCID) 40 MG tablet Take 40 mg by mouth daily, Historical Med      finasteride (PROSCAR) 5 mg tablet Take 5 mg by mouth, Historical Med      furosemide (LASIX) 20 mg tablet Take 20 mg by mouth every 24 hours, Historical Med      gabapentin (NEURONTIN) 100 mg capsule Take 100 mg by mouth daily, Historical Med      !! levothyroxine 25 mcg tablet Take 25 mcg by mouth every other day , Historical Med      !! levothyroxine 50 mcg tablet Take 50 mcg by mouth, Historical Med      pantoprazole (PROTONIX) 40 mg tablet Take 40 mg by mouth, Historical Med      tamsulosin (FLOMAX) 0.4 mg Take 0.4 mg by mouth, Starting Wed 2/15/2012, Historical Med      cilostazol (PLETAL) 50 mg tablet Take 50 mg by mouth, Historical Med      HYDROcodone-acetaminophen (NORCO) 5-325 mg per tablet q6hprn, Historical Med      losartan (COZAAR) 50 mg tablet Take 50 mg by mouth, Historical Med      ranitidine (ZANTAC) 150 MG capsule Take 150 mg by mouth, Historical Med       !! - Potential duplicate medications found. Please discuss with provider.        Outpatient Discharge Orders   Nebulizer Supplies     ED SEPSIS DOCUMENTATION   Time reflects when diagnosis was documented in both MDM as applicable and the Disposition within this note       Time User Action Codes Description Comment    10/26/2024  3:30 PM Serafin Pond Add  [J06.9] Viral upper respiratory tract infection     10/26/2024  3:30 PM Serafin Pond Add [R07.89] Chest tightness     10/26/2024  4:54 PM Serafin Pond Add [J44.1] COPD exacerbation (HCC)     10/26/2024  4:54 PM Serafin Pond Modify [J06.9] Viral upper respiratory tract infection     10/26/2024  4:54 PM Serafin Pond Modify [J44.1] COPD exacerbation (HCC)                  Serafin Pond DO  10/26/24 1913

## 2024-10-26 NOTE — ED RE-EVALUATION NOTE
Patient resting comfortably.  Abdominal exam unremarkable.  Checking LFTs, Mg, Phos for completion.  Administering Tigan for continued nausea.  Disposition to be determined. Must be able to tolerate PO to be discharged.     Augustine Kelly, DO  10/26/24 1377

## 2024-10-27 LAB
ATRIAL RATE: 53 BPM
ATRIAL RATE: 53 BPM
P AXIS: 30 DEGREES
P AXIS: 33 DEGREES
PR INTERVAL: 190 MS
PR INTERVAL: 202 MS
QRS AXIS: 67 DEGREES
QRS AXIS: 72 DEGREES
QRSD INTERVAL: 138 MS
QRSD INTERVAL: 140 MS
QT INTERVAL: 486 MS
QT INTERVAL: 512 MS
QTC INTERVAL: 456 MS
QTC INTERVAL: 480 MS
T WAVE AXIS: -55 DEGREES
T WAVE AXIS: 18 DEGREES
VENTRICULAR RATE: 53 BPM
VENTRICULAR RATE: 53 BPM

## 2024-10-27 PROCEDURE — 93010 ELECTROCARDIOGRAM REPORT: CPT | Performed by: INTERNAL MEDICINE
